# Patient Record
Sex: FEMALE | Race: WHITE | NOT HISPANIC OR LATINO | Employment: OTHER | URBAN - METROPOLITAN AREA
[De-identification: names, ages, dates, MRNs, and addresses within clinical notes are randomized per-mention and may not be internally consistent; named-entity substitution may affect disease eponyms.]

---

## 2022-06-01 ENCOUNTER — HOSPITAL ENCOUNTER (OUTPATIENT)
Dept: OTHER | Facility: HOSPITAL | Age: 42
Discharge: HOME | End: 2022-06-01

## 2022-06-28 ENCOUNTER — HOSPITAL ENCOUNTER (OUTPATIENT)
Dept: OTHER | Facility: HOSPITAL | Age: 42
Discharge: HOME | End: 2022-06-28

## 2024-07-09 ENCOUNTER — OFFICE VISIT (OUTPATIENT)
Dept: FAMILY MEDICINE CLINIC | Age: 44
End: 2024-07-09
Payer: MEDICAID

## 2024-07-09 VITALS
HEART RATE: 75 BPM | DIASTOLIC BLOOD PRESSURE: 88 MMHG | BODY MASS INDEX: 37.32 KG/M2 | HEIGHT: 65 IN | WEIGHT: 224 LBS | SYSTOLIC BLOOD PRESSURE: 129 MMHG

## 2024-07-09 DIAGNOSIS — Z12.31 ENCOUNTER FOR SCREENING MAMMOGRAM FOR MALIGNANT NEOPLASM OF BREAST: ICD-10-CM

## 2024-07-09 DIAGNOSIS — E66.9 OBESITY (BMI 30-39.9): ICD-10-CM

## 2024-07-09 DIAGNOSIS — Q18.1 CYST ON EAR: ICD-10-CM

## 2024-07-09 DIAGNOSIS — E78.2 MIXED HYPERLIPIDEMIA: ICD-10-CM

## 2024-07-09 DIAGNOSIS — Z00.00 ENCOUNTER FOR MEDICAL EXAMINATION TO ESTABLISH CARE: Primary | ICD-10-CM

## 2024-07-09 DIAGNOSIS — R45.4 IRRITABILITY AND ANGER: ICD-10-CM

## 2024-07-09 DIAGNOSIS — M65.331 TRIGGER MIDDLE FINGER OF RIGHT HAND: ICD-10-CM

## 2024-07-09 DIAGNOSIS — Z13.31 NEGATIVE DEPRESSION SCREENING: ICD-10-CM

## 2024-07-09 DIAGNOSIS — F41.1 GAD (GENERALIZED ANXIETY DISORDER): ICD-10-CM

## 2024-07-09 DIAGNOSIS — F17.201 TOBACCO ABUSE, IN REMISSION: ICD-10-CM

## 2024-07-09 PROCEDURE — 99204 OFFICE O/P NEW MOD 45 MIN: CPT | Performed by: NURSE PRACTITIONER

## 2024-07-09 RX ORDER — SERTRALINE HYDROCHLORIDE 100 MG/1
100 TABLET, FILM COATED ORAL DAILY
COMMUNITY

## 2024-07-09 RX ORDER — ATORVASTATIN CALCIUM 20 MG/1
20 TABLET, FILM COATED ORAL DAILY
COMMUNITY

## 2024-07-09 NOTE — ASSESSMENT & PLAN NOTE
She will continue atorvastatin 20 mg daily.  Refills not needed today.  Will plan to recheck CMP and lipid panel in 6 months to see if dose adjustment has helped trial levels.  Regular exercise and low-fat diet also encouraged.

## 2024-07-09 NOTE — ASSESSMENT & PLAN NOTE
Does not look concerning today, has been present for several years.  Offered to refer to dermatology for removal but she declines for now.

## 2024-07-09 NOTE — ASSESSMENT & PLAN NOTE
Patient says Zoloft works well for her but she is concerned about anger outburst.  For this reason we referred to psychiatry.  discussed benefits of anger management counseling

## 2024-07-09 NOTE — ASSESSMENT & PLAN NOTE
Patient was counseled to eat low fat, low carb diet limiting fried food/sweets and increase fruits/vegetables/whole grains.  Patient was also encouraged to implement moderate intensity exercise as tolerated.  Recommend eating in a calorie deficit.

## 2024-07-09 NOTE — PROGRESS NOTES
Liliana Whatley presents to [facility] with complaint of  Establish Care (Former pcp Dr Hickey at the [facility]. //GYN - Dr Rose )    SUBJECTIVE  History of Present Illness    Very pleasant patient is here to establish care.  She was previously going to free [facility].  She has her own cleaning business. She is single, has a 16 yr old daughter. Her OB/GYN is Dr. Rose.  She has medical conditions of anxiety/depression and hyperlipidemia. She is on Zoloft, has been on this for 2 years.  She is concerned about her mental behavior as she says she gets upset very easily.  She says that she has a lot of mental issues in her family, specifically bipolar disorder.  She is interested in seeing a psychiatrist as she feels she is not able to manage her anger and snaps off easily she says.  She quit smoking cigarettes 9 months ago.  Is concerned with the amount of weight she has gained since she is stop smoking.  She uses alcohol on occasion, uses marijuana periodically.  She denies any family or surgical history.     Patient also has issue with right hand middle finger walking in place.  Not overly painful.  She was told that this was trigger fingers and is wanting to have this looked at today.    She also has a cyst behind her left ear.  Has been present for several years.  Not in full or bothersome.  Will occasionally drain white pus she says, but this is rare.  She would also like to have this looked at.    The following portions of the patient's history were reviewed and updated as appropriate: allergies, current medications, past family history, past medical history, past social history, past surgical history and problem list. Pap smear 2 years, scheduled for well woman with obgyn in Sept. she had screening labs completed last month.  Her cholesterol levels had increased and previous PCP increased her atorvastatin from 10 mg to 20 mg.  She is tolerating dose adjustment  "okay. Due for mammo.     OBJECTIVE  Vital Signs:   /88 (BP Location: Left arm, Patient Position: Sitting)   Pulse 75   Ht 165.1 cm (65\")   Wt 102 kg (224 lb)   BMI 37.28 kg/m²       Physical Exam  Vitals reviewed.   Constitutional:       General: She is not in acute distress.     Appearance: Normal appearance. She is obese. She is not ill-appearing.   HENT:      Head: Normocephalic and atraumatic.      Comments: Small round cyst noted behind left ear, not infectious in appearance      Nose: Nose normal.      Mouth/Throat:      Mouth: Mucous membranes are moist.      Pharynx: Oropharynx is clear.   Cardiovascular:      Rate and Rhythm: Normal rate and regular rhythm.      Pulses: Normal pulses.      Heart sounds: Normal heart sounds.   Pulmonary:      Effort: Pulmonary effort is normal.      Breath sounds: Normal breath sounds.   Musculoskeletal:      Right hand: Tenderness (middle finger) present. No swelling or deformity. Decreased range of motion (middle finger). Normal capillary refill. Normal pulse.      Cervical back: Neck supple.   Skin:     General: Skin is warm and dry.   Neurological:      General: No focal deficit present.      Mental Status: She is alert and oriented to person, place, and time. Mental status is at baseline.   Psychiatric:         Mood and Affect: Mood normal.         Behavior: Behavior normal.         Judgment: Judgment normal.          Results Review:  The following data was reviewed by Jewels Ro, CAITLYN [unfilled] 11:12 EDT.  COMPREHENSIVE METABOLIC PANEL (06/04/2024 12:12)  LIPID PANEL (06/04/2024 12:12)  CBC with automated diff (06/04/2024 12:12)  Folate, Serum (06/04/2024 12:12)  TSH (06/04/2024 12:12)  Vitamin B1 (Thiamine), Whole Blood(SENDOUT) (06/04/2024 12:12)  Vitamin B12 (06/04/2024 12:12)  VITAMIN D,25-HYDROXY (06/04/2024 12:12)    ASSESSMENT AND PLAN:  Diagnoses and all orders for this visit:    1. Encounter for medical examination to establish care (Primary)    2. " Mixed hyperlipidemia  Assessment & Plan:  She will continue atorvastatin 20 mg daily.  Refills not needed today.  Will plan to recheck CMP and lipid panel in 6 months to see if dose adjustment has helped trial levels.  Regular exercise and low-fat diet also encouraged.    Orders:  -     Lipid Panel; Future  -     Comprehensive Metabolic Panel; Future    3. IRMA (generalized anxiety disorder)  Assessment & Plan:  Patient says Zoloft works well for her but she is concerned about anger outburst.  For this reason we referred to psychiatry.  discussed benefits of anger management counseling    Orders:  -     Ambulatory Referral to Psychiatry    4. Tobacco abuse, in remission  Assessment & Plan:  congratulated for with 9 months of smoking cessation      5. Irritability and anger  -     Ambulatory Referral to Psychiatry    6. Negative depression screening    7. Encounter for screening mammogram for malignant neoplasm of breast  -     Mammo Screening Digital Tomosynthesis Bilateral With CAD; Future    8. Trigger middle finger of right hand  Assessment & Plan:  Will refer to orthopedics for steroid injection    Orders:  -     Ambulatory Referral to Orthopedic Surgery    9. Cyst on ear  Assessment & Plan:  Does not look concerning today, has been present for several years.  Offered to refer to dermatology for removal but she declines for now.      10. Obesity (BMI 30-39.9)  Assessment & Plan:  Patient was counseled to eat low fat, low carb diet limiting fried food/sweets and increase fruits/vegetables/whole grains.  Patient was also encouraged to implement moderate intensity exercise as tolerated.  Recommend eating in a calorie deficit.              Follow Up   Return in about 6 months (around 1/9/2025). Patient to notify office with any acute concerns or issues.  Patient verbalizes understanding, agrees with plan of care and has no further questions upon discharge.     Patient was given instructions and counseling regarding  her condition or for health maintenance advice. Please see specific information pulled into the AVS if appropriate.     Discussed the importance of following up with any needed screening tests/labs/specialist appointments and any requested follow-up recommended by me today. Importance of maintaining follow-up discussed and patient accepts that missed appointments can delay diagnosis and potentially lead to worsening of conditions.    Part of this note may be an electronic transcription/translation of spoken language to printed text using the Dragon Dictation System.

## 2024-07-30 ENCOUNTER — HOSPITAL ENCOUNTER (OUTPATIENT)
Dept: MAMMOGRAPHY | Facility: HOSPITAL | Age: 44
Discharge: HOME | End: 2024-07-30
Admitting: NURSE PRACTITIONER
Payer: MEDICAID

## 2024-07-30 DIAGNOSIS — Z12.31 ENCOUNTER FOR SCREENING MAMMOGRAM FOR MALIGNANT NEOPLASM OF BREAST: ICD-10-CM

## 2024-07-30 PROCEDURE — 77067 SCR MAMMO BI INCL CAD: CPT

## 2024-07-30 PROCEDURE — 77063 BREAST TOMOSYNTHESIS BI: CPT

## 2024-09-13 ENCOUNTER — OFFICE VISIT (OUTPATIENT)
Dept: BEHAVIORAL HEALTH | Facility: CLINIC | Age: 44
End: 2024-09-13
Payer: MEDICAID

## 2024-09-13 VITALS
WEIGHT: 225.2 LBS | HEIGHT: 65 IN | DIASTOLIC BLOOD PRESSURE: 78 MMHG | SYSTOLIC BLOOD PRESSURE: 110 MMHG | BODY MASS INDEX: 37.52 KG/M2 | HEART RATE: 77 BPM

## 2024-09-13 DIAGNOSIS — R45.4 DIFFICULTY CONTROLLING ANGER: ICD-10-CM

## 2024-09-13 DIAGNOSIS — Z91.410 HISTORY OF DOMESTIC PHYSICAL ABUSE IN ADULT: ICD-10-CM

## 2024-09-13 DIAGNOSIS — Z63.9 RELATIONSHIP DYSFUNCTION: ICD-10-CM

## 2024-09-13 DIAGNOSIS — Z91.49 HISTORY OF PSYCHOLOGICAL TRAUMA: ICD-10-CM

## 2024-09-13 DIAGNOSIS — Z91.414 HISTORY OF ABUSE BY INTIMATE PARTNER IN ADULTHOOD: ICD-10-CM

## 2024-09-13 DIAGNOSIS — F41.1 GENERALIZED ANXIETY DISORDER: Primary | ICD-10-CM

## 2024-09-13 PROCEDURE — 1160F RVW MEDS BY RX/DR IN RCRD: CPT | Performed by: NURSE PRACTITIONER

## 2024-09-13 PROCEDURE — 90792 PSYCH DIAG EVAL W/MED SRVCS: CPT | Performed by: NURSE PRACTITIONER

## 2024-09-13 PROCEDURE — 1159F MED LIST DOCD IN RCRD: CPT | Performed by: NURSE PRACTITIONER

## 2024-09-13 SDOH — SOCIAL STABILITY - SOCIAL INSECURITY: PROBLEM RELATED TO PRIMARY SUPPORT GROUP, UNSPECIFIED: Z63.9

## 2025-01-20 ENCOUNTER — TELEPHONE (OUTPATIENT)
Dept: FAMILY MEDICINE CLINIC | Age: 45
End: 2025-01-20
Payer: MEDICAID

## 2025-01-27 RX ORDER — SERTRALINE HYDROCHLORIDE 100 MG/1
100 TABLET, FILM COATED ORAL DAILY
Status: CANCELLED | OUTPATIENT
Start: 2025-01-27

## 2025-01-28 RX ORDER — ATORVASTATIN CALCIUM 20 MG/1
20 TABLET, FILM COATED ORAL DAILY
Qty: 30 TABLET | Refills: 0 | OUTPATIENT
Start: 2025-01-28

## 2025-02-13 ENCOUNTER — LAB (OUTPATIENT)
Dept: LAB | Facility: HOSPITAL | Age: 45
End: 2025-02-13
Payer: MEDICAID

## 2025-02-13 DIAGNOSIS — E78.2 MIXED HYPERLIPIDEMIA: ICD-10-CM

## 2025-02-13 LAB
ALBUMIN SERPL-MCNC: 4.2 G/DL (ref 3.5–5.2)
ALBUMIN/GLOB SERPL: 1.5 G/DL
ALP SERPL-CCNC: 78 U/L (ref 39–117)
ALT SERPL W P-5'-P-CCNC: 20 U/L (ref 1–33)
ANION GAP SERPL CALCULATED.3IONS-SCNC: 8.2 MMOL/L (ref 5–15)
AST SERPL-CCNC: 20 U/L (ref 1–32)
BILIRUB SERPL-MCNC: 0.3 MG/DL (ref 0–1.2)
BUN SERPL-MCNC: 14 MG/DL (ref 6–20)
BUN/CREAT SERPL: 19.2 (ref 7–25)
CALCIUM SPEC-SCNC: 9.2 MG/DL (ref 8.6–10.5)
CHLORIDE SERPL-SCNC: 104 MMOL/L (ref 98–107)
CHOLEST SERPL-MCNC: 270 MG/DL (ref 0–200)
CO2 SERPL-SCNC: 24.8 MMOL/L (ref 22–29)
CREAT SERPL-MCNC: 0.73 MG/DL (ref 0.57–1)
EGFRCR SERPLBLD CKD-EPI 2021: 104.8 ML/MIN/1.73
GLOBULIN UR ELPH-MCNC: 2.8 GM/DL
GLUCOSE SERPL-MCNC: 117 MG/DL (ref 65–99)
HDLC SERPL-MCNC: 75 MG/DL (ref 40–60)
LDLC SERPL CALC-MCNC: 170 MG/DL (ref 0–100)
LDLC/HDLC SERPL: 2.23 {RATIO}
POTASSIUM SERPL-SCNC: 4.3 MMOL/L (ref 3.5–5.2)
PROT SERPL-MCNC: 7 G/DL (ref 6–8.5)
SODIUM SERPL-SCNC: 137 MMOL/L (ref 136–145)
TRIGL SERPL-MCNC: 139 MG/DL (ref 0–150)
VLDLC SERPL-MCNC: 25 MG/DL (ref 5–40)

## 2025-02-13 PROCEDURE — 80053 COMPREHEN METABOLIC PANEL: CPT

## 2025-02-13 PROCEDURE — 80061 LIPID PANEL: CPT

## 2025-02-13 PROCEDURE — 36415 COLL VENOUS BLD VENIPUNCTURE: CPT

## 2025-02-14 RX ORDER — ATORVASTATIN CALCIUM 20 MG/1
20 TABLET, FILM COATED ORAL DAILY
Qty: 90 TABLET | Refills: 1 | Status: SHIPPED | OUTPATIENT
Start: 2025-02-14

## 2025-02-20 ENCOUNTER — TELEPHONE (OUTPATIENT)
Dept: FAMILY MEDICINE CLINIC | Age: 45
End: 2025-02-20

## 2025-02-20 ENCOUNTER — OFFICE VISIT (OUTPATIENT)
Dept: FAMILY MEDICINE CLINIC | Age: 45
End: 2025-02-20
Payer: MEDICAID

## 2025-02-20 VITALS
BODY MASS INDEX: 39.65 KG/M2 | HEIGHT: 65 IN | WEIGHT: 238 LBS | OXYGEN SATURATION: 97 % | TEMPERATURE: 97.8 F | DIASTOLIC BLOOD PRESSURE: 66 MMHG | HEART RATE: 84 BPM | SYSTOLIC BLOOD PRESSURE: 116 MMHG

## 2025-02-20 DIAGNOSIS — H00.14 CHALAZION LEFT UPPER EYELID: ICD-10-CM

## 2025-02-20 DIAGNOSIS — R73.03 PREDIABETES: ICD-10-CM

## 2025-02-20 DIAGNOSIS — E78.2 MIXED HYPERLIPIDEMIA: Primary | ICD-10-CM

## 2025-02-20 DIAGNOSIS — R73.01 ELEVATED FASTING GLUCOSE: ICD-10-CM

## 2025-02-20 DIAGNOSIS — E66.9 OBESITY (BMI 30-39.9): ICD-10-CM

## 2025-02-20 DIAGNOSIS — F41.1 GAD (GENERALIZED ANXIETY DISORDER): ICD-10-CM

## 2025-02-20 DIAGNOSIS — F17.201 TOBACCO ABUSE, IN REMISSION: ICD-10-CM

## 2025-02-20 LAB
EXPIRATION DATE: ABNORMAL
HBA1C MFR BLD: 6.3 % (ref 4.5–5.7)
Lab: ABNORMAL

## 2025-02-20 RX ORDER — METFORMIN HYDROCHLORIDE 500 MG/1
500 TABLET, EXTENDED RELEASE ORAL
Qty: 90 TABLET | Refills: 1 | Status: SHIPPED | OUTPATIENT
Start: 2025-02-20

## 2025-02-20 RX ORDER — ERYTHROMYCIN 5 MG/G
OINTMENT OPHTHALMIC EVERY 6 HOURS
Qty: 3.5 G | Refills: 0 | Status: SHIPPED | OUTPATIENT
Start: 2025-02-20 | End: 2025-02-27

## 2025-02-20 NOTE — PROGRESS NOTES
"Liliana Whatley presents to [facility] with complaint of  Hyperlipidemia (Discuss labs from 2/13/25 ) and Cyst (Cyst on LT eyelid X 3 weeks )    SUBJECTIVE  History of Present Illness    Patient is here for chronic illness follow-up of hyperlipidemia, obesity, and to go over recent labs.  She had lipid panel checked last week that showed total cholesterol of 270 and LDL level of 170.  Patient had ran out of her atorvastatin.  She is currently on 20 mg dose.  Tolerates the medication well.  Patient is frustrated with her weight.  She has gained 14 pounds in the past 6 months.  She does endorse not following a healthy diet and no longer exercises.  Patient says that she also contributes weight gain to stopping smoking.  Patient's fasting glucose was 117 as well.  Her A1c today in office is 6.3.  She does not have a former diagnosis of prediabetes or diabetes.  She does have history of PCOS.    Patient also has \"knot\" on her left upper eyelid.  Not has been present for the past 3 weeks.  She is not using any over-the-counter medications or home remedies for this.  Knot is not painful, no erythema or vision changes, drainage from eye mentioned.      OBJECTIVE  Vital Signs:   /66 (BP Location: Right arm, Patient Position: Sitting, Cuff Size: Adult)   Pulse 84   Temp 97.8 °F (36.6 °C) (Oral)   Ht 165.1 cm (65\")   Wt 108 kg (238 lb)   SpO2 97%   BMI 39.61 kg/m²       Physical Exam  Vitals reviewed.   Constitutional:       General: She is not in acute distress.     Appearance: Normal appearance. She is not ill-appearing.   HENT:      Head: Normocephalic and atraumatic.      Nose: Nose normal.      Mouth/Throat:      Mouth: Mucous membranes are moist.      Pharynx: Oropharynx is clear.   Eyes:      General: Lids are everted, no foreign bodies appreciated. Gaze aligned appropriately.         Left eye: No foreign body or discharge.      Extraocular Movements: Extraocular movements " intact.      Conjunctiva/sclera:      Left eye: Left conjunctiva is not injected. No chemosis, exudate or hemorrhage.     Comments: 0.5 cm round and firm flesh colored nodule noted to mid left upper eyelid, nontender, lid everted and white pustular area also seen under this eyelid at area of nodule    Cardiovascular:      Rate and Rhythm: Normal rate and regular rhythm.      Pulses: Normal pulses.      Heart sounds: Normal heart sounds.   Pulmonary:      Effort: Pulmonary effort is normal.      Breath sounds: Normal breath sounds.   Musculoskeletal:      Cervical back: Neck supple.   Skin:     General: Skin is warm and dry.   Neurological:      General: No focal deficit present.      Mental Status: She is alert and oriented to person, place, and time. Mental status is at baseline.   Psychiatric:         Mood and Affect: Mood normal.         Behavior: Behavior normal.         Judgment: Judgment normal.          Results Review:  The following data was reviewed by CAITLYN Torres [unfilled] 10:57 EST.    Office Visit on 02/20/2025   Component Date Value Ref Range Status    Hemoglobin A1C 02/20/2025 6.3 (A)  4.5 - 5.7 % Final    Lot Number 02/20/2025 #[id number]   Final    Expiration Date 02/20/2025 10-31-26   Final   Lab on 02/13/2025   Component Date Value Ref Range Status    Glucose 02/13/2025 117 (H)  65 - 99 mg/dL Final    BUN 02/13/2025 14  6 - 20 mg/dL Final    Creatinine 02/13/2025 0.73  0.57 - 1.00 mg/dL Final    Sodium 02/13/2025 137  136 - 145 mmol/L Final    Potassium 02/13/2025 4.3  3.5 - 5.2 mmol/L Final    Chloride 02/13/2025 104  98 - 107 mmol/L Final    CO2 02/13/2025 24.8  22.0 - 29.0 mmol/L Final    Calcium 02/13/2025 9.2  8.6 - 10.5 mg/dL Final    Total Protein 02/13/2025 7.0  6.0 - 8.5 g/dL Final    Albumin 02/13/2025 4.2  3.5 - 5.2 g/dL Final    ALT (SGPT) 02/13/2025 20  1 - 33 U/L Final    AST (SGOT) 02/13/2025 20  1 - 32 U/L Final    Alkaline Phosphatase 02/13/2025 78  39 - 117 U/L Final    Total  Bilirubin 02/13/2025 0.3  0.0 - 1.2 mg/dL Final    Globulin 02/13/2025 2.8  gm/dL Final    A/G Ratio 02/13/2025 1.5  g/dL Final    BUN/Creatinine Ratio 02/13/2025 19.2  7.0 - 25.0 Final    Anion Gap 02/13/2025 8.2  5.0 - 15.0 mmol/L Final    eGFR 02/13/2025 104.8  >60.0 mL/min/1.73 Final    Total Cholesterol 02/13/2025 270 (H)  0 - 200 mg/dL Final    Triglycerides 02/13/2025 139  0 - 150 mg/dL Final    HDL Cholesterol 02/13/2025 75 (H)  40 - 60 mg/dL Final    LDL Cholesterol  02/13/2025 170 (H)  0 - 100 mg/dL Final    VLDL Cholesterol 02/13/2025 25  5 - 40 mg/dL Final    LDL/HDL Ratio 02/13/2025 2.23   Final         ASSESSMENT AND PLAN:  Diagnoses and all orders for this visit:    1. Mixed hyperlipidemia (Primary)  Assessment & Plan:  Continue atorvastatin 20 mg daily, recheck lipid panel in 6 months.  Weight loss and low-fat diet encouraged      2. Elevated fasting glucose  -     POC Glycosylated Hemoglobin (Hb A1C)    3. Chalazion left upper eyelid  Assessment & Plan:  Recommended applying warm compresses for 15-minute intervals multiple times throughout the day, also prescribed erythromycin ointment to apply every 6 hours for the next week.  If she does not see any change after 7 days, she will need referral to ophthalmology.  We can place this for her if needed.     Orders:  -     erythromycin (ROMYCIN) 5 MG/GM ophthalmic ointment; Administer  into the left eye Every 6 (Six) Hours for 7 days.  Dispense: 3.5 g; Refill: 0    4. Prediabetes  Assessment & Plan:  Starting metformin, low-carb diet and weight loss encouraged.  Resume exercise as she was doing previously.  Recheck A1c in 3 to 6 months.    Orders:  -     metFORMIN ER (GLUCOPHAGE-XR) 500 MG 24 hr tablet; Take 1 tablet by mouth Daily With Breakfast.  Dispense: 90 tablet; Refill: 1    5. Tobacco abuse, in remission    6. Obesity (BMI 30-39.9)  Assessment & Plan:  Patient's (Body mass index is 39.61 kg/m².) indicates that they are obese (BMI >30) with health  conditions that include impaired fasting glucose and dyslipidemias . Weight is worsening. BMI  is above average; BMI management plan is completed. We discussed portion control, increasing exercise, an von-based approach such as MyFitness Pal or Lose It, and starting metformin .       7. IRMA (generalized anxiety disorder)  Assessment & Plan:  On Zoloft, was referred to psychiatry but patient never scheduled appointment        Patient got up and left appointment (seemingly in a rush) before I could explain that I am leaving the practice, and that she will need to establish care with another provider.  We will call her to discuss this need and for need to repeat labs in 6 months.    Follow Up   Return in about 6 months (around 8/20/2025) for Annual physical. Patient to notify office with any acute concerns or issues.  Patient verbalizes understanding, agrees with plan of care and has no further questions upon discharge.     Patient was given instructions and counseling regarding her condition or for health maintenance advice. Please see specific information pulled into the AVS if appropriate.     Discussed the importance of following up with any needed screening tests/labs/specialist appointments and any requested follow-up recommended by me today. Importance of maintaining follow-up discussed and patient accepts that missed appointments can delay diagnosis and potentially lead to worsening of conditions.    Part of this note may be an electronic transcription/translation of spoken language to printed text using the Dragon Dictation System.

## 2025-02-20 NOTE — ASSESSMENT & PLAN NOTE
Starting metformin, low-carb diet and weight loss encouraged.  Resume exercise as she was doing previously.  Recheck A1c in 3 to 6 months.

## 2025-02-20 NOTE — ASSESSMENT & PLAN NOTE
Recommended applying warm compresses for 15-minute intervals multiple times throughout the day, also prescribed erythromycin ointment to apply every 6 hours for the next week.  If she does not see any change after 7 days, she will need referral to ophthalmology.  We can place this for her if needed.

## 2025-02-20 NOTE — ASSESSMENT & PLAN NOTE
Continue atorvastatin 20 mg daily, recheck lipid panel in 6 months.  Weight loss and low-fat diet encouraged

## 2025-02-20 NOTE — TELEPHONE ENCOUNTER
OKAY FOR JACOB TO CALEB Called pt to make MARLA appt with a new provider in 6 mons (around 8/25) being that Jewels is transferring to a new office. She will need to come fasting to do labs.

## 2025-02-20 NOTE — ASSESSMENT & PLAN NOTE
Patient's (Body mass index is 39.61 kg/m².) indicates that they are obese (BMI >30) with health conditions that include impaired fasting glucose and dyslipidemias . Weight is worsening. BMI  is above average; BMI management plan is completed. We discussed portion control, increasing exercise, an von-based approach such as [employer] Pal or Lose It, and starting metformin .

## 2025-02-20 NOTE — Clinical Note
Pt left apt before I could tell her I am leaving office (she seemed to be in a hurry), can you call her and schedule 6 month follow up with new provider for annual exam please? Will be due for labs then so she should come fasting. Thanks

## 2025-06-05 ENCOUNTER — TELEMEDICINE (OUTPATIENT)
Dept: FAMILY MEDICINE CLINIC | Facility: TELEHEALTH | Age: 45
End: 2025-06-05
Payer: MEDICAID

## 2025-06-05 DIAGNOSIS — R30.0 DYSURIA: Primary | ICD-10-CM

## 2025-06-05 PROCEDURE — 99213 OFFICE O/P EST LOW 20 MIN: CPT | Performed by: NURSE PRACTITIONER

## 2025-06-05 RX ORDER — NITROFURANTOIN 25; 75 MG/1; MG/1
100 CAPSULE ORAL 2 TIMES DAILY
Qty: 10 CAPSULE | Refills: 0 | Status: SHIPPED | OUTPATIENT
Start: 2025-06-05 | End: 2025-06-10
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
## 2025-08-21 DIAGNOSIS — R73.03 PREDIABETES: ICD-10-CM

## 2025-08-25 RX ORDER — ATORVASTATIN CALCIUM 20 MG/1
20 TABLET, FILM COATED ORAL DAILY
Qty: 30 TABLET | Refills: 0 | Status: SHIPPED | OUTPATIENT
Start: 2025-08-25 | End: 2025-08-26 | Stop reason: SDUPTHER

## 2025-08-25 RX ORDER — METFORMIN HYDROCHLORIDE 500 MG/1
500 TABLET, EXTENDED RELEASE ORAL
Qty: 30 TABLET | Refills: 0 | Status: SHIPPED | OUTPATIENT
Start: 2025-08-25 | End: 2025-08-26 | Stop reason: SDUPTHER

## 2025-08-26 ENCOUNTER — OFFICE VISIT (OUTPATIENT)
Dept: FAMILY MEDICINE CLINIC | Age: 45
End: 2025-08-26
Payer: MEDICAID

## 2025-08-26 ENCOUNTER — HOSPITAL ENCOUNTER (OUTPATIENT)
Dept: GENERAL RADIOLOGY | Facility: HOSPITAL | Age: 45
Discharge: HOME | End: 2025-08-26
Payer: MEDICAID

## 2025-08-26 VITALS
BODY MASS INDEX: 38.15 KG/M2 | HEART RATE: 83 BPM | TEMPERATURE: 98.8 F | DIASTOLIC BLOOD PRESSURE: 82 MMHG | HEIGHT: 65 IN | OXYGEN SATURATION: 97 % | SYSTOLIC BLOOD PRESSURE: 135 MMHG | WEIGHT: 229 LBS

## 2025-08-26 DIAGNOSIS — Z87.891 HISTORY OF SMOKING 30 OR MORE PACK YEARS: ICD-10-CM

## 2025-08-26 DIAGNOSIS — F51.04 PSYCHOPHYSIOLOGICAL INSOMNIA: ICD-10-CM

## 2025-08-26 DIAGNOSIS — E66.9 OBESITY (BMI 30-39.9): ICD-10-CM

## 2025-08-26 DIAGNOSIS — E28.2 PCOS (POLYCYSTIC OVARIAN SYNDROME): ICD-10-CM

## 2025-08-26 DIAGNOSIS — R05.3 CHRONIC COUGH: ICD-10-CM

## 2025-08-26 DIAGNOSIS — F12.90 MARIJUANA SMOKER: ICD-10-CM

## 2025-08-26 DIAGNOSIS — Z23 ENCOUNTER FOR ADMINISTRATION OF VACCINE: ICD-10-CM

## 2025-08-26 DIAGNOSIS — E78.2 MIXED HYPERLIPIDEMIA: Primary | ICD-10-CM

## 2025-08-26 DIAGNOSIS — Z00.8 ENCOUNTER FOR PULMONARY FUNCTION TESTING: ICD-10-CM

## 2025-08-26 DIAGNOSIS — R22.42 MASS OF LOWER EXTREMITY, LEFT: ICD-10-CM

## 2025-08-26 DIAGNOSIS — R73.03 PREDIABETES: ICD-10-CM

## 2025-08-26 DIAGNOSIS — F41.1 GAD (GENERALIZED ANXIETY DISORDER): ICD-10-CM

## 2025-08-26 DIAGNOSIS — Z11.59 ENCOUNTER FOR HEPATITIS C SCREENING TEST FOR LOW RISK PATIENT: ICD-10-CM

## 2025-08-26 RX ORDER — ATORVASTATIN CALCIUM 20 MG/1
20 TABLET, FILM COATED ORAL DAILY
Qty: 90 TABLET | Refills: 1 | Status: SHIPPED | OUTPATIENT
Start: 2025-08-26

## 2025-08-26 RX ORDER — SERTRALINE HYDROCHLORIDE 100 MG/1
100 TABLET, FILM COATED ORAL DAILY
Qty: 90 TABLET | Refills: 1 | Status: SHIPPED | OUTPATIENT
Start: 2025-08-26

## 2025-08-26 RX ORDER — METFORMIN HYDROCHLORIDE 500 MG/1
500 TABLET, EXTENDED RELEASE ORAL
Qty: 90 TABLET | Refills: 1 | Status: SHIPPED | OUTPATIENT
Start: 2025-08-26

## 2025-08-26 RX ORDER — ALBUTEROL SULFATE 1.25 MG/3ML
1.25 SOLUTION RESPIRATORY (INHALATION) ONCE
Status: COMPLETED | OUTPATIENT
Start: 2025-08-26 | End: 2025-08-26

## 2025-08-26 RX ADMIN — ALBUTEROL SULFATE 1.25 MG: 1.25 SOLUTION RESPIRATORY (INHALATION) at 10:25
